# Patient Record
Sex: MALE
[De-identification: names, ages, dates, MRNs, and addresses within clinical notes are randomized per-mention and may not be internally consistent; named-entity substitution may affect disease eponyms.]

---

## 2021-12-24 ENCOUNTER — TRANSCRIBE ORDERS (OUTPATIENT)
Dept: OTHER | Age: 31
End: 2021-12-24
Payer: COMMERCIAL

## 2021-12-24 ENCOUNTER — TELEPHONE (OUTPATIENT)
Dept: URGENT CARE | Facility: CLINIC | Age: 31
End: 2021-12-24
Payer: COMMERCIAL

## 2021-12-24 DIAGNOSIS — R10.84 ABDOMINAL PAIN, GENERALIZED: ICD-10-CM

## 2021-12-24 DIAGNOSIS — K27.9 PUD (PEPTIC ULCER DISEASE): ICD-10-CM

## 2021-12-24 DIAGNOSIS — Z86.19 HISTORY OF HELICOBACTER PYLORI INFECTION: Primary | ICD-10-CM

## 2021-12-24 NOTE — TELEPHONE ENCOUNTER
Left a message for patient that sorry our clinic does have a long wait list and he can contact his ordering provider who can call our provider to provider line to request earlier appt.

## 2021-12-24 NOTE — TELEPHONE ENCOUNTER
[facility] Call Center    Phone Message    May a detailed message be left on voicemail: yes     Reason for Call: Other:     Obanayami is calling to schedule appt with referral.  Appt set for 7/27/22. He is experiencing a great deal of pain and bloating right now.  He says he has been vomiting for several days and has lost 50 lbs in 2 months.  Please call to advise ASAP    Action Taken: Message routed to:  [facility] ([facility]): gastro    Travel Screening: Not Applicable

## 2022-06-01 DIAGNOSIS — Z22.7 LATENT TUBERCULOSIS: ICD-10-CM

## 2022-06-01 RX ORDER — RIFAMPIN 300 MG/1
CAPSULE ORAL
Qty: 60 CAPSULE | Refills: 3 | OUTPATIENT
Start: 2022-06-01

## 2022-07-18 ENCOUNTER — TELEPHONE (OUTPATIENT)
Dept: URGENT CARE | Facility: CLINIC | Age: 32
End: 2022-07-18

## 2022-07-18 ENCOUNTER — TELEPHONE (OUTPATIENT)
Dept: GASTROENTEROLOGY | Facility: CLINIC | Age: 32
End: 2022-07-18

## 2022-07-18 NOTE — TELEPHONE ENCOUNTER
LVM to reschedule appt on 7/27 with Dr. Gilbert. Provider switching template to Tuesday clinic. Please reschedule on Tuesdays clinic date using any held slots for 40 min. Left L pod number